# Patient Record
(demographics unavailable — no encounter records)

---

## 2024-12-10 NOTE — DISCUSSION/SUMMARY
[FreeTextEntry1] : bmp to check the na/K  she will start the spiranalactone for her skin. Discussed with pt the importance of exercise 2.5 Hours min. a week as per the [employer] , Calcium equalling 1000 and vitamin D 1000 iu daily . SHe will return in one year. sent for mammo. , discussed normal signs and sx of perimenopause  normal vs not. . Importance of follow up.Answered any questions she may have. Encouraged SBE  disc smaller proportions, change up the exercise program. monitor cycles

## 2024-12-10 NOTE — HISTORY OF PRESENT ILLNESS
[Currently Active] : currently active [Men] : men [FreeTextEntry1] : 51 yr one or 2 period in the last year, occ hot flashe. no leaking.  SHe has some issues with acne we discussed spiranalactone  [Mammogramdate] : 8/23 [ColonoscopyDate] : never [TextBox_43] : disc randolph from PMD